# Patient Record
Sex: MALE | Race: WHITE | URBAN - METROPOLITAN AREA
[De-identification: names, ages, dates, MRNs, and addresses within clinical notes are randomized per-mention and may not be internally consistent; named-entity substitution may affect disease eponyms.]

---

## 2021-05-26 ENCOUNTER — OFFICE VISIT (OUTPATIENT)
Dept: URBAN - METROPOLITAN AREA CLINIC 29 | Facility: CLINIC | Age: 41
End: 2021-05-26
Payer: COMMERCIAL

## 2021-05-26 DIAGNOSIS — Z18.39 OTHER RETAINED ORGANIC FRAGMENTS: Primary | ICD-10-CM

## 2021-05-26 PROCEDURE — 65222 REMOVE FOREIGN BODY FROM EYE: CPT | Performed by: OPTOMETRIST

## 2021-05-26 NOTE — IMPRESSION/PLAN
Impression: Other retained organic fragments: Z18.39. Plan: Discussed diagnosis in detail with patient. Discussed treatment options with patient. Surgical treatment is required. Patient elects to have surgery. Will proceed with surgical treatment today. Patient instructed to call if condition gets worse.